# Patient Record
Sex: MALE | Race: WHITE | NOT HISPANIC OR LATINO | Employment: FULL TIME | URBAN - NONMETROPOLITAN AREA
[De-identification: names, ages, dates, MRNs, and addresses within clinical notes are randomized per-mention and may not be internally consistent; named-entity substitution may affect disease eponyms.]

---

## 2019-07-03 PROBLEM — E03.9 ACQUIRED HYPOTHYROIDISM: Status: ACTIVE | Noted: 2018-09-18

## 2019-07-03 PROBLEM — F33.42 RECURRENT MAJOR DEPRESSIVE DISORDER, IN FULL REMISSION: Status: ACTIVE | Noted: 2018-09-18

## 2019-07-03 PROBLEM — K21.9 GASTROESOPHAGEAL REFLUX DISEASE WITHOUT ESOPHAGITIS: Status: ACTIVE | Noted: 2018-09-18

## 2019-07-03 PROBLEM — I10 ESSENTIAL HYPERTENSION: Status: ACTIVE | Noted: 2018-09-18

## 2021-08-26 DIAGNOSIS — U07.1 COVID-19 VIRUS DETECTED: ICD-10-CM

## 2022-03-24 PROBLEM — F32.A DEPRESSION: Status: ACTIVE | Noted: 2022-03-24

## 2022-10-19 ENCOUNTER — HISTORICAL (OUTPATIENT)
Dept: ADMINISTRATIVE | Facility: HOSPITAL | Age: 52
End: 2022-10-19

## 2022-10-20 ENCOUNTER — HISTORICAL (OUTPATIENT)
Dept: ADMINISTRATIVE | Facility: HOSPITAL | Age: 52
End: 2022-10-20

## 2023-03-23 PROBLEM — R73.03 PREDIABETES: Status: ACTIVE | Noted: 2023-03-23

## 2023-04-19 ENCOUNTER — HOSPITAL ENCOUNTER (OUTPATIENT)
Dept: RADIOLOGY | Facility: HOSPITAL | Age: 53
Discharge: HOME | End: 2023-04-19
Attending: NURSE PRACTITIONER
Payer: OTHER MISCELLANEOUS

## 2023-04-19 DIAGNOSIS — S39.011A ABDOMINAL MUSCLE STRAIN: ICD-10-CM

## 2023-04-19 PROCEDURE — 76700 US EXAM ABDOM COMPLETE: CPT | Mod: TC

## 2023-04-20 NOTE — PROGRESS NOTES
Ibuprofen 200 mg (OTC) 3 tablets every 8 hours Plus Tylenol (OTC) 2 tablets every 4 hours.  This should help as well as resting.      Thanks,  Riccardo

## 2023-04-20 NOTE — PROGRESS NOTES
I called Paul and left a message for him to call back.  Please let him know that his US is normal - no hernia.  I would recommend continued light duty for a week then follow up with me to determine when he can go back full duty.  Please send information to GIOVANNY and Niels as needed.  He can schedule his follow up with me in a week when he calls back.  Thanks,  Riccardo

## 2023-04-20 NOTE — PROGRESS NOTES
Mr Miller called I instructed him on above normal US ,no hernia noted and FU in one week with light duty until FU , he is asking for something for pain, taking ibuprofen , pain up to 7/10 with movement  Please advise ,thanks

## 2023-04-21 NOTE — PROGRESS NOTES
Called patient to educate him on above and he verbalized understanding, he did say pain is easing up today

## 2023-07-17 ENCOUNTER — HOSPITAL ENCOUNTER (OUTPATIENT)
Dept: RADIOLOGY | Facility: HOSPITAL | Age: 53
Discharge: HOME | End: 2023-07-17
Attending: FAMILY MEDICINE
Payer: COMMERCIAL

## 2023-07-17 PROCEDURE — 74019 RADEX ABDOMEN 2 VIEWS: CPT | Mod: TC

## 2023-07-17 PROCEDURE — 76705 ECHO EXAM OF ABDOMEN: CPT | Mod: TC

## 2025-01-04 ENCOUNTER — HOSPITAL ENCOUNTER (EMERGENCY)
Facility: HOSPITAL | Age: 55
Discharge: HOME | End: 2025-01-04
Attending: SURGERY
Payer: COMMERCIAL

## 2025-01-04 VITALS
WEIGHT: 280 LBS | TEMPERATURE: 98 F | OXYGEN SATURATION: 97 % | BODY MASS INDEX: 40.09 KG/M2 | SYSTOLIC BLOOD PRESSURE: 126 MMHG | HEART RATE: 102 BPM | HEIGHT: 70 IN | RESPIRATION RATE: 20 BRPM | DIASTOLIC BLOOD PRESSURE: 95 MMHG

## 2025-01-04 DIAGNOSIS — G89.29 CHRONIC LEFT-SIDED LOW BACK PAIN WITH LEFT-SIDED SCIATICA: ICD-10-CM

## 2025-01-04 DIAGNOSIS — M54.42 CHRONIC LEFT-SIDED LOW BACK PAIN WITH LEFT-SIDED SCIATICA: ICD-10-CM

## 2025-01-04 DIAGNOSIS — S39.012A LUMBAR STRAIN, INITIAL ENCOUNTER: Primary | ICD-10-CM

## 2025-01-04 PROCEDURE — 96372 THER/PROPH/DIAG INJ SC/IM: CPT

## 2025-01-04 PROCEDURE — 63600175 PHARM REV CODE 636 W HCPCS: Mod: JZ

## 2025-01-04 PROCEDURE — 99284 EMERGENCY DEPT VISIT MOD MDM: CPT | Mod: 25

## 2025-01-04 PROCEDURE — 25000003 PHARM REV CODE 250

## 2025-01-04 RX ORDER — MELOXICAM 7.5 MG/1
7.5 TABLET ORAL 2 TIMES DAILY
Qty: 60 TABLET | Refills: 0 | Status: SHIPPED | OUTPATIENT
Start: 2025-01-04 | End: 2025-01-04

## 2025-01-04 RX ORDER — HYDROCODONE BITARTRATE AND ACETAMINOPHEN 5; 325 MG/1; MG/1
1 TABLET ORAL
Status: COMPLETED | OUTPATIENT
Start: 2025-01-04 | End: 2025-01-04

## 2025-01-04 RX ORDER — CYCLOBENZAPRINE HCL 10 MG
10 TABLET ORAL 3 TIMES DAILY PRN
Qty: 15 TABLET | Refills: 0 | Status: SHIPPED | OUTPATIENT
Start: 2025-01-04 | End: 2025-01-09 | Stop reason: SDUPTHER

## 2025-01-04 RX ORDER — CYCLOBENZAPRINE HCL 10 MG
10 TABLET ORAL 3 TIMES DAILY PRN
Qty: 15 TABLET | Refills: 0 | Status: SHIPPED | OUTPATIENT
Start: 2025-01-04 | End: 2025-01-04

## 2025-01-04 RX ORDER — METHYLPREDNISOLONE SOD SUCC 125 MG
125 VIAL (EA) INJECTION
Status: COMPLETED | OUTPATIENT
Start: 2025-01-04 | End: 2025-01-04

## 2025-01-04 RX ORDER — CYCLOBENZAPRINE HCL 10 MG
10 TABLET ORAL
Status: COMPLETED | OUTPATIENT
Start: 2025-01-04 | End: 2025-01-04

## 2025-01-04 RX ORDER — MELOXICAM 7.5 MG/1
7.5 TABLET ORAL 2 TIMES DAILY
Qty: 60 TABLET | Refills: 0 | Status: SHIPPED | OUTPATIENT
Start: 2025-01-04 | End: 2025-02-03

## 2025-01-04 RX ORDER — HYDROCODONE BITARTRATE AND ACETAMINOPHEN 5; 325 MG/1; MG/1
1 TABLET ORAL EVERY 6 HOURS PRN
Qty: 12 TABLET | Refills: 0 | Status: SHIPPED | OUTPATIENT
Start: 2025-01-04

## 2025-01-04 RX ADMIN — METHYLPREDNISOLONE SODIUM SUCCINATE 125 MG: 125 INJECTION, POWDER, FOR SOLUTION INTRAMUSCULAR; INTRAVENOUS at 07:01

## 2025-01-04 RX ADMIN — HYDROCODONE BITARTRATE AND ACETAMINOPHEN 1 TABLET: 5; 325 TABLET ORAL at 07:01

## 2025-01-04 RX ADMIN — CYCLOBENZAPRINE 10 MG: 10 TABLET, FILM COATED ORAL at 07:01

## 2025-01-05 NOTE — DISCHARGE INSTRUCTIONS
Meloxicam as prescribed for pain. Can alternate with tylenol. If no relief can take a muscle relaxer or main medicine. Moist heat, range of motion as tolerated. Follow-up with PCP for re-evaluation if continued pain, follow up with specialist as scheduled. Return with new or worsening symptoms.

## 2025-01-05 NOTE — ED PROVIDER NOTES
Encounter Date: 1/4/2025       History     Chief Complaint   Patient presents with    Back Pain     Hx of sciatica and bulging disk L5 area. Was setting groceries on table and felt a pull in back. Ibuprofen 800 mg taken. Pain starts in lower back, goes down left leg and into groin.      See MDM.     The history is provided by the patient. No  was used.     Review of patient's allergies indicates:  No Known Allergies  Past Medical History:   Diagnosis Date    ADHD (attention deficit hyperactivity disorder)     Anxiety     GERD (gastroesophageal reflux disease)     Hyperlipidemia     Hypertension     Thyroid disease      Past Surgical History:   Procedure Laterality Date    carpel tunnel       right hand     FRACTURE SURGERY      TONSILLECTOMY       Family History   Problem Relation Name Age of Onset    Hypertension Mother      Hypertension Father       Social History     Tobacco Use    Smoking status: Never    Smokeless tobacco: Never   Substance Use Topics    Alcohol use: Not Currently     Review of Systems   Genitourinary:  Negative for difficulty urinating.   Musculoskeletal:  Positive for back pain.   Neurological:  Negative for numbness.   All other systems reviewed and are negative.      Physical Exam     Initial Vitals [01/04/25 1816]   BP Pulse Resp Temp SpO2   (!) 151/94 (!) 112 20 97.8 °F (36.6 °C) 98 %      MAP       --         Physical Exam    Nursing note and vitals reviewed.  Constitutional: He appears well-developed and well-nourished. He is not diaphoretic. No distress.   HENT:   Head: Normocephalic and atraumatic.   Cardiovascular:  Normal rate and intact distal pulses.           Pulmonary/Chest: No respiratory distress.   Musculoskeletal:      Comments: Ambulatory in ED with steady gait. No reproducible tenderness. Points to tenderness of midline L5 and left lateral tenderness around lateral hip and beltline. Distal pulses palpable. Neurovascularly intact. All other adjacent  joints otherwise normal.       Neurological: He is alert and oriented to person, place, and time.         ED Course   Procedures  Labs Reviewed - No data to display       Imaging Results    None          Medications   methylPREDNISolone sodium succinate injection 125 mg (125 mg Intramuscular Given 1/4/25 1917)   cyclobenzaprine tablet 10 mg (10 mg Oral Given 1/4/25 1918)   HYDROcodone-acetaminophen 5-325 mg per tablet 1 tablet (1 tablet Oral Given 1/4/25 1918)     Medical Decision Making  Pt is a 55 y/o male who presents with back pain since this afternoon. States that he has hx of low back pain and has undergone treatment for it in the past but that this pain is more severe than normal. States that type of pain is the same. Describes as pulling/stabbing pain in his low back, radiates around his belt line and down the back of the leg, with some radiation to the front. Notes that when the pain started he was putting down a gallon of milk onto the counter when he had sudden onset sharp pain. Rates pain a >10/10, normally is a 5-6/10. Has taken ibuprofen 800mg and iced the back just PTA. Typically takes ibuprofen for the pain. Has been through physical therapy and epidurals in the past without significant relief. Follows with a chiropractor. Has a referral to a specialist and is awaiting his appointment. Denies bowel/bladder dysfunction, has urinated while being in ED. Denies groin numbness/tingling.     Pt ambulatory in ED. No red flag symptoms. Pain improved in ED. Has upcoming appointment with specialist. Will send home with muscle relaxers as needed for pain, if no improvement with anti inflammatories. Strict ED precautions given. Pt expressed understanding and was in agreement with the plan.     Amount and/or Complexity of Data Reviewed  External Data Reviewed: notes.     Details: Reviewed notes from PCP, last seen on 12/31 for back pain    Risk  Prescription drug management.                                       Clinical Impression:  Final diagnoses:  [S39.012A] Lumbar strain, initial encounter (Primary)  [M54.42, G89.29] Chronic left-sided low back pain with left-sided sciatica          ED Disposition Condition    Discharge Stable          ED Prescriptions       Medication Sig Dispense Start Date End Date Auth. Provider    cyclobenzaprine (FLEXERIL) 10 MG tablet Take 1 tablet (10 mg total) by mouth 3 (three) times daily as needed for Muscle spasms. 15 tablet 1/4/2025 1/9/2025 Mariel Trinidad PA    meloxicam (MOBIC) 7.5 MG tablet Take 1 tablet (7.5 mg total) by mouth 2 (two) times a day. 60 tablet 1/4/2025 2/3/2025 Mariel Trinidad PA    HYDROcodone-acetaminophen (NORCO) 5-325 mg per tablet Take 1 tablet by mouth every 6 (six) hours as needed for Pain. 12 tablet 1/4/2025 -- Mariel Trinidad PA          Follow-up Information       Follow up With Specialties Details Why Contact Info    LENY Kirkpatrick MD Family Medicine Call in 1 week  [address] [place] [facility] [address]  [phone]               Mariel Trinidad PA  01/04/25 1939       Mariel Trinidad PA  01/04/25 1947